# Patient Record
Sex: MALE | Race: WHITE | NOT HISPANIC OR LATINO | Employment: STUDENT | URBAN - METROPOLITAN AREA
[De-identification: names, ages, dates, MRNs, and addresses within clinical notes are randomized per-mention and may not be internally consistent; named-entity substitution may affect disease eponyms.]

---
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
## 2020-02-10 ENCOUNTER — OFFICE VISIT (OUTPATIENT)
Dept: PULMONOLOGY | Facility: CLINIC | Age: 20
End: 2020-02-10
Payer: MEDICAID

## 2020-02-10 ENCOUNTER — LAB VISIT (OUTPATIENT)
Dept: LAB | Facility: HOSPITAL | Age: 20
End: 2020-02-10
Attending: INTERNAL MEDICINE
Payer: MEDICAID

## 2020-02-10 ENCOUNTER — OFFICE VISIT (OUTPATIENT)
Dept: GASTROENTEROLOGY | Facility: CLINIC | Age: 20
End: 2020-02-10
Payer: MEDICAID

## 2020-02-10 VITALS
DIASTOLIC BLOOD PRESSURE: 73 MMHG | BODY MASS INDEX: 25.71 KG/M2 | OXYGEN SATURATION: 98 % | HEART RATE: 86 BPM | WEIGHT: 205.69 LBS | SYSTOLIC BLOOD PRESSURE: 116 MMHG

## 2020-02-10 VITALS
SYSTOLIC BLOOD PRESSURE: 117 MMHG | WEIGHT: 206.13 LBS | HEIGHT: 75 IN | RESPIRATION RATE: 18 BRPM | HEART RATE: 92 BPM | DIASTOLIC BLOOD PRESSURE: 75 MMHG | BODY MASS INDEX: 25.63 KG/M2

## 2020-02-10 DIAGNOSIS — R07.89 CHEST TIGHTNESS: ICD-10-CM

## 2020-02-10 DIAGNOSIS — R13.10 DYSPHAGIA, UNSPECIFIED TYPE: ICD-10-CM

## 2020-02-10 DIAGNOSIS — Z87.09 HISTORY OF ASTHMA: ICD-10-CM

## 2020-02-10 DIAGNOSIS — R13.19 ESOPHAGEAL DYSPHAGIA: Primary | ICD-10-CM

## 2020-02-10 DIAGNOSIS — R06.09 DOE (DYSPNEA ON EXERTION): ICD-10-CM

## 2020-02-10 DIAGNOSIS — J45.40 MODERATE PERSISTENT ASTHMA WITHOUT COMPLICATION: ICD-10-CM

## 2020-02-10 DIAGNOSIS — M25.571 ARTHRALGIA OF RIGHT ANKLE: ICD-10-CM

## 2020-02-10 PROBLEM — J45.909 MODERATE ASTHMA: Status: ACTIVE | Noted: 2020-02-10

## 2020-02-10 LAB
ALBUMIN SERPL BCP-MCNC: 4.6 G/DL (ref 3.5–5.2)
ALP SERPL-CCNC: 94 U/L (ref 55–135)
ALT SERPL W/O P-5'-P-CCNC: 23 U/L (ref 10–44)
ANION GAP SERPL CALC-SCNC: 9 MMOL/L (ref 8–16)
AST SERPL-CCNC: 17 U/L (ref 10–40)
BASOPHILS # BLD AUTO: 0.05 K/UL (ref 0–0.2)
BASOPHILS NFR BLD: 0.7 % (ref 0–1.9)
BILIRUB SERPL-MCNC: 0.6 MG/DL (ref 0.1–1)
BUN SERPL-MCNC: 11 MG/DL (ref 6–20)
CALCIUM SERPL-MCNC: 10 MG/DL (ref 8.7–10.5)
CHLORIDE SERPL-SCNC: 100 MMOL/L (ref 95–110)
CO2 SERPL-SCNC: 30 MMOL/L (ref 23–29)
CREAT SERPL-MCNC: 1.1 MG/DL (ref 0.5–1.4)
CRP SERPL-MCNC: 4.7 MG/L (ref 0–8.2)
DIFFERENTIAL METHOD: NORMAL
EOSINOPHIL # BLD AUTO: 0.3 K/UL (ref 0–0.5)
EOSINOPHIL NFR BLD: 4.1 % (ref 0–8)
ERYTHROCYTE [DISTWIDTH] IN BLOOD BY AUTOMATED COUNT: 11.7 % (ref 11.5–14.5)
ERYTHROCYTE [SEDIMENTATION RATE] IN BLOOD BY WESTERGREN METHOD: 10 MM/HR (ref 0–10)
EST. GFR  (AFRICAN AMERICAN): >60 ML/MIN/1.73 M^2
EST. GFR  (NON AFRICAN AMERICAN): >60 ML/MIN/1.73 M^2
GLUCOSE SERPL-MCNC: 91 MG/DL (ref 70–110)
HCT VFR BLD AUTO: 47.4 % (ref 40–54)
HGB BLD-MCNC: 15.5 G/DL (ref 14–18)
IMM GRANULOCYTES # BLD AUTO: 0.01 K/UL (ref 0–0.04)
LYMPHOCYTES # BLD AUTO: 1.8 K/UL (ref 1–4.8)
LYMPHOCYTES NFR BLD: 25.4 % (ref 18–48)
MCH RBC QN AUTO: 30.2 PG (ref 27–31)
MCHC RBC AUTO-ENTMCNC: 32.7 G/DL (ref 32–36)
MCV RBC AUTO: 92 FL (ref 82–98)
MONOCYTES # BLD AUTO: 0.4 K/UL (ref 0.3–1)
MONOCYTES NFR BLD: 5.6 % (ref 4–15)
NEUTROPHILS # BLD AUTO: 4.5 K/UL (ref 1.8–7.7)
NEUTROPHILS NFR BLD: 64.1 % (ref 38–73)
NRBC BLD-RTO: 0 /100 WBC
PLATELET # BLD AUTO: 247 K/UL (ref 150–350)
PMV BLD AUTO: 9.8 FL (ref 9.2–12.9)
POTASSIUM SERPL-SCNC: 4.3 MMOL/L (ref 3.5–5.1)
PROT SERPL-MCNC: 7.9 G/DL (ref 6–8.4)
RBC # BLD AUTO: 5.14 M/UL (ref 4.6–6.2)
SODIUM SERPL-SCNC: 139 MMOL/L (ref 136–145)
WBC # BLD AUTO: 7.08 K/UL (ref 3.9–12.7)

## 2020-02-10 PROCEDURE — 86038 ANTINUCLEAR ANTIBODIES: CPT

## 2020-02-10 PROCEDURE — 80053 COMPREHEN METABOLIC PANEL: CPT

## 2020-02-10 PROCEDURE — 99999 PR PBB SHADOW E&M-NEW PATIENT-LVL III: ICD-10-PCS | Mod: PBBFAC,,, | Performed by: INTERNAL MEDICINE

## 2020-02-10 PROCEDURE — 99999 PR PBB SHADOW E&M-NEW PATIENT-LVL III: CPT | Mod: PBBFAC,,, | Performed by: INTERNAL MEDICINE

## 2020-02-10 PROCEDURE — 99204 PR OFFICE/OUTPT VISIT, NEW, LEVL IV, 45-59 MIN: ICD-10-PCS | Mod: S$PBB,,, | Performed by: NURSE PRACTITIONER

## 2020-02-10 PROCEDURE — 99205 PR OFFICE/OUTPT VISIT, NEW, LEVL V, 60-74 MIN: ICD-10-PCS | Mod: S$PBB,,, | Performed by: INTERNAL MEDICINE

## 2020-02-10 PROCEDURE — 85025 COMPLETE CBC W/AUTO DIFF WBC: CPT

## 2020-02-10 PROCEDURE — 99205 OFFICE O/P NEW HI 60 MIN: CPT | Mod: S$PBB,,, | Performed by: INTERNAL MEDICINE

## 2020-02-10 PROCEDURE — 99204 OFFICE O/P NEW MOD 45 MIN: CPT | Mod: S$PBB,,, | Performed by: NURSE PRACTITIONER

## 2020-02-10 PROCEDURE — 99214 OFFICE O/P EST MOD 30 MIN: CPT | Mod: PBBFAC,PO,27 | Performed by: NURSE PRACTITIONER

## 2020-02-10 PROCEDURE — 99203 OFFICE O/P NEW LOW 30 MIN: CPT | Mod: PBBFAC,27,PO | Performed by: INTERNAL MEDICINE

## 2020-02-10 PROCEDURE — 85651 RBC SED RATE NONAUTOMATED: CPT

## 2020-02-10 PROCEDURE — 86140 C-REACTIVE PROTEIN: CPT

## 2020-02-10 PROCEDURE — 99999 PR PBB SHADOW E&M-EST. PATIENT-LVL IV: ICD-10-PCS | Mod: PBBFAC,,, | Performed by: NURSE PRACTITIONER

## 2020-02-10 PROCEDURE — 99999 PR PBB SHADOW E&M-EST. PATIENT-LVL IV: CPT | Mod: PBBFAC,,, | Performed by: NURSE PRACTITIONER

## 2020-02-10 RX ORDER — OMEPRAZOLE 40 MG/1
40 CAPSULE, DELAYED RELEASE ORAL DAILY
Qty: 30 CAPSULE | Refills: 2 | Status: SHIPPED | OUTPATIENT
Start: 2020-02-10 | End: 2020-05-10

## 2020-02-10 RX ORDER — OMEPRAZOLE 20 MG/1
CAPSULE, DELAYED RELEASE ORAL
COMMUNITY
Start: 2020-01-29 | End: 2020-02-10

## 2020-02-10 RX ORDER — BUDESONIDE AND FORMOTEROL FUMARATE DIHYDRATE 160; 4.5 UG/1; UG/1
AEROSOL RESPIRATORY (INHALATION)
COMMUNITY
Start: 2020-01-29

## 2020-02-10 NOTE — LETTER
February 10, 2020      Martha Escobar MD  [address] [facility] [address] [facility] [address]           [facility]  [address]  [facility] [address]  Phone: [phone]  Fax: [phone]          Patient: Jose Rincon   MR Number: [id number]   YOB: 2000   Date of Visit: 2/10/2020       Dear Dr. Martha Escobar:    Thank you for referring Jose Rincon to me for evaluation. Attached you will find relevant portions of my assessment and plan of care.    If you have questions, please do not hesitate to call me. I look forward to following Jose Rincon along with you.    Sincerely,    Ena Alcantara MD    Enclosure  CC:  No Recipients    If you would like to receive this communication electronically, please contact [email or web address] or [phone] to request more information on [employer] Link access.    For providers and/or their staff who would like to refer a patient to [facility], please contact us through our one-stop-shop provider referral line, [facility], at [phone].    If you feel you have received this communication in error or would no longer like to receive these types of communications, please e-mail [email or web address]

## 2020-02-10 NOTE — PROGRESS NOTES
"2/10/2020    Jose Rincon  New Patient Consult    Chief Complaint   Patient presents with    Shortness of Breath     chest tightness        HPI:Pt is a 20 yo male with asthma presenting for new evaluation.  Pt had bronchitis in 1/2020- symptoms were "allergies" then woke up with sweat/fevers/chills one night then developed cough w/ phlegm, tx with antibiotics and steroids and then the cough and phlegm were better. He went to the ED for an episode where he was sitting on the bench during basketball and became very short of breath and tachycardic but did not have to be admitted.    Pt has had dyspnea with exertion since this illness. DE LA GARZA on 1 flight of stairs. He goes to school in [place] where he plays basketball. Pt is here with his mother who assists with the history. He had allergies/asthma since childhood and was tx with zyrtec, singulair, an inhaler and nebulizer tx which he stopped when he was a teenager and feeling better. He moved to a new house in [place] in summer 2019- had an asthma attack in summer.   He is also seeing an allergist, cardiologist- for chest pain, and GI doctor- for trouble swallowing.  Pt denies frequent URIs but says he has had the flu several times.  Father had asthma as a child and mother has exercise induced asthma.  He lives in a basement in a house in [place]; shares with roommates. No specific exposure at the house. No one smokes. Denies vaping or any drug use. Does not smoke. Does not have pets or do any outdoor activities.  Inhalers: symbicort since 8/2019. Doesn't use rescue inh much but used to use before exercise.    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    The chief compliant  problem is new to me  PFSH:  Past Medical History:   Diagnosis Date    Asthma     Seasonal allergies          Past Surgical History:   Procedure " Laterality Date    HAND SURGERY      KNEE SURGERY Right     arthroscopy times 2    MOUTH SURGERY  2016    pulled a tooth     Social History     Tobacco Use    Smoking status: Never Smoker    Smokeless tobacco: Never Used   Substance Use Topics    Alcohol use: Yes     Alcohol/week: 1.0 standard drinks     Types: 1 Cans of beer per week     Comment: social, 2-3 x's per month    Drug use: No     Family History   Problem Relation Age of Onset    No Known Problems Mother     No Known Problems Father      Review of patient's allergies indicates:   Allergen Reactions    Biaxin [clarithromycin] Hives       Performance Status:The patient's activity level is functions out of house.      Review of Systems:  a review of eleven systems covering constitutional, Eye, HEENT, Psych, Respiratory, Cardiac, GI, , Musculoskeletal, Endocrine, Dermatologic was negative except for pertinent findings as listed ABOVE and below:  Wt loss 15#  Joint swelling and tenderness R ankle   Dysphagia with food sticking in esophagus, progressive over several months    Exam:Comprehensive exam done. /73 (BP Location: Right arm, Patient Position: Sitting)   Pulse 86   Wt 93.3 kg (205 lb 11 oz)   SpO2 98% Comment: on room air at rest  BMI 25.71 kg/m²   Exam included Vitals as listed, and patient's appearance and affect and alertness and mood, oral exam for yeast and hygiene and pharynx lesions and Mallapatti (M) score, neck with inspection for jvd and masses and thyroid abnormalities and lymph nodes (supraclavicular and infraclavicular nodes and axillary also examined and noted if abn), chest exam included symmetry and effort and fremitus and percussion and auscultation, cardiac exam included rhythm and gallops and murmur and rubs and jvd and edema, abdominal exam for mass and hepatosplenomegaly and tenderness and hernias and bowel sounds, Musculoskeletal exam with muscle tone and posture and mobility/gait and  strength, and skin  for rashes and cyanosis and pallor and turgor, extremity for clubbing.  Findings were normal except for pertinent findings listed below:  Tall, well appearing  Healing oral ulcer on L buccal mucosa  M1  Basilar crackles on left up to mid lung zone  R ankle examined- with tenderness on medial side inferior to malleolus, no erythema or swelling    Radiographs (ct chest and cxr) reviewed: view by direct vision   CXR 2016- normal    Labs none available   Ordering new labs    PFT will be done and results to be reviewed      Plan:  Clinical impression is ambiguous and will need repeated evaluation wrt dyspnea on exertion for 4 weeks preceded by upper respiratory infection, also with dysphagia pending GI evaluation.  With crackles in the lungs on physical exam, concern for inflammatory vs autoimmune process.  Patient was previously healthy and an athlete with exercise induced well controlled asthma.    Jose was seen today for shortness of breath.    Diagnoses and all orders for this visit:    DE LA GARZA (dyspnea on exertion)  -     CBC auto differential; Future  -     QUENTIN; Future  -     Comprehensive metabolic panel; Future  -     Complete PFT with bronchodilator; Future  -     Sedimentation rate; Future  -     C-REACTIVE PROTEIN; Future  -     CT Chest Without Contrast; Future    Moderate persistent asthma without complication    Dysphagia, unspecified type    Arthralgia of right ankle        Follow up in about 4 weeks (around 3/9/2020).    Discussed with patient above for education the following:      Patient Instructions   Go to the lab and get blood work  Follow up with GI  Follow up with allergist  Continue symbicort inhaler  Get CT chest  Get pulmonary function tests  Get records from allergist     With blunting of inspiratory limb on PFTs concerning for fixed extrathoracic or variable intrathoracic obstruction- adding on CT of neck

## 2020-02-10 NOTE — LETTER
February 10, 2020      Martha Escobar MD  [address] [facility] [address]           [facility] - Gastroenterology  [address] [facility] [address]  [facility] [address]  Phone: [phone]          Patient: Jose Rincon   MR Number: [id number]   YOB: 2000   Date of Visit: 2/10/2020       Dear Dr. Martha Escobar:    Thank you for referring Jose Rincon to me for evaluation. Attached you will find relevant portions of my assessment and plan of care.    If you have questions, please do not hesitate to call me. I look forward to following Jose Rincon along with you.    Sincerely,    Susan Lora, NP    Enclosure  CC:  No Recipients    If you would like to receive this communication electronically, please contact [email or web address] or [phone] to request more information on [employer] Link access.    For providers and/or their staff who would like to refer a patient to [facility], please contact us through our one-stop-shop provider referral line, [facility] , at [phone].    If you feel you have received this communication in error or would no longer like to receive these types of communications, please e-mail [email or web address]

## 2020-02-10 NOTE — PATIENT INSTRUCTIONS
Go to the lab and get blood work  Follow up with GI  Follow up with allergist  Continue symbicort inhaler  Get CT chest  Get pulmonary function tests  Get records from allergist

## 2020-02-10 NOTE — PATIENT INSTRUCTIONS
Soft Diet  Your healthcare provider has prescribed a soft diet (also called gastrointestinal soft diet). This means eating foods that are soft, low in fiber, and easy to digest. This diet is for people with digestive problems. A soft diet provides foods that are easy to chew and swallow. Foods should be bite-size and very soft or moist. Follow your healthcare providers specific instructions about what foods and drinks you may have. The general guidelines below can help you get started on this diet.       Beverages  OK: Milk, tea, coffee, fruit juices, carbonated beverages, nutrition shakes, and drinks (Note: Thin liquids may be hard to swallow. They may need to be thickened.)  Avoid: None, unless they need to be thickened  Breads and crackers  OK: Refined white, wheat, or seedless rye bread; mitchel or soda crackers that have been moistened; plain rolls or bagels; very soft tortillas  Avoid: Whole-grain breads, rolls, or bagels with nuts, raisins, or seeds; crackers, croutons, taco shells  Cereals and grains  OK: Cooked cereals, plain dry cereals that have been moistened, plain macaroni, spaghetti, noodles, rice  Avoid: Whole-grain cereals and granola, or cereals containing bran, raisins, seeds or nuts; coconut; brown or wild rice  Desserts and sweets  OK: Moist cake; soft fruit pie with bottom crust only; soft cookies moistened in milk or other liquid; gelatin, custard, pudding, plain ice cream, plain sherbet, sugar, honey, clear jelly  Avoid: Pastries, desserts, and ice cream that have nuts, coconut, seeds, or dried fruit; popcorn; chips of any kind including potato chips and tortilla chips; jam, marmalade  Eggs and cheese  OK: Poached, soft boiled, or scrambled eggs; cottage cheese, ricotta cheese, cream cheese, cheese sauces, or cheese melted in other dishes  Avoid: Crisp fried eggs, cheese slices and cubes  Fruits  OK: Avocado, banana, baked peeled apple, applesauce, peeled ripe peaches or pears, canned fruit  (apricots, cherries, peaches, pears), melons  Avoid: Raw apple, dried fruits, coconut, pineapple, grapes, fruit luis, fruit snacks  Meat and fish  OK: All fresh meat, poultry, or fish that is cooked until tender  Avoid: Meat, fish, or poultry that is fried; tough or stringy meat including monsalve, sausage, bratwurst, jerky, corned beef  Other protein foods  OK: Tofu, baked beans, smooth peanut butter or other nut or seed butters  Avoid: Deep-fried tofu; crunchy peanut or other nut or seed butters; nuts or seeds that are whole or chopped  Soups  OK: All soups, but they may need to be thickened. Thin liquid may be too hard to swallow.  Avoid: Soups made with stringy meat pieces or chunky vegetables  Vegetables  OK: Peeled and well-cooked potatoes or sweet potatoes; fresh, cooked, canned, or frozen vegetables without seeds, skin, or coarse fiber  Avoid: Raw vegetables, deep-fried vegetables (such as tempura), and corn  Date Last Reviewed: 8/1/2016  © 4355-2286 [employer]. [address] [place] [address], [place] [address]. All rights reserved. This information is not intended as a substitute for professional medical care. Always follow your healthcare professional's instructions.

## 2020-02-10 NOTE — PROGRESS NOTES
Subjective:       Patient ID: Jose Rincon is a 19 y.o. male, Body mass index is 25.76 kg/m².    Chief Complaint: Dysphagia      Patient is new to me.    Gastroesophageal Reflux   He complains of chest pain (chest tightness) and dysphagia. He reports no abdominal pain, no belching, no choking, no coughing, no early satiety, no globus sensation, no heartburn, no hoarse voice, no nausea or no sore throat. This is a chronic problem. The current episode started more than 1 month ago (Started several months ago). The problem occurs frequently. The problem has been gradually worsening. The symptoms are aggravated by certain foods (dysphagia to solids, especially meats; denies trouble swallowing liquid; occ has to vomit to get food up). Pertinent negatives include no anemia, melena or weight loss. There are no known risk factors. He has tried a PPI (Tried one week of Omeprazole 20 mg) for the symptoms. The treatment provided no relief. Past procedures do not include an EGD.     Review of Systems   Constitutional: Negative for appetite change, fever, unexpected weight change and weight loss.   HENT: Negative for hoarse voice, sore throat and trouble swallowing.    Respiratory: Negative for cough, choking and shortness of breath.    Cardiovascular: Positive for chest pain (chest tightness).   Gastrointestinal: Positive for dysphagia. Negative for abdominal pain, blood in stool, constipation, diarrhea, heartburn, melena, nausea and vomiting.   Genitourinary: Negative for difficulty urinating and dysuria.   Musculoskeletal: Negative for gait problem.   Skin: Negative for rash.   Neurological: Negative for speech difficulty.   Psychiatric/Behavioral: Negative for confusion.       Past Medical History:   Diagnosis Date    Asthma     Seasonal allergies       Past Surgical History:   Procedure Laterality Date    HAND SURGERY      KNEE SURGERY Right     arthroscopy times 2    MOUTH SURGERY  2016    pulled a tooth       Family History   Problem Relation Age of Onset    No Known Problems Mother     No Known Problems Father       Wt Readings from Last 10 Encounters:   02/10/20 93.5 kg (206 lb 2.1 oz)   12/29/17 90.7 kg (200 lb) (92 %, Z= 1.40)*   08/01/17 89.8 kg (198 lb) (92 %, Z= 1.39)*   10/09/16 88.5 kg (195 lb 1.7 oz) (92 %, Z= 1.40)*     * Growth percentiles are based on CDC (Boys, 2-20 Years) data.     Lab Results   Component Value Date    WBC 6.62 10/09/2016    HGB 17.1 10/09/2016    HCT 49.6 10/09/2016    MCV 88 10/09/2016     10/09/2016              Reviewed prior medical records including endoscopy history (see surgical history).     Objective:      Physical Exam   Constitutional: He is oriented to person, place, and time. He appears well-developed and well-nourished.   HENT:   Head: Normocephalic.   Eyes: Pupils are equal, round, and reactive to light.   Neck: Normal range of motion.   Cardiovascular: Normal rate, regular rhythm and normal heart sounds.   No murmur heard.  Pulmonary/Chest: Breath sounds normal. No respiratory distress. He has no wheezes.   Abdominal: Soft. Bowel sounds are normal. He exhibits no distension and no mass. There is no tenderness. There is no guarding.   Musculoskeletal: Normal range of motion.   Neurological: He is alert and oriented to person, place, and time.   Skin: Skin is warm and dry. No rash noted.   Non jaundiced   Psychiatric: He has a normal mood and affect. His speech is normal.         Assessment:       1. Esophageal dysphagia    2. Chest tightness    3. History of asthma           Plan:   All diagnoses and orders for this visit:    Esophageal dysphagia  - Start: omeprazole (PRILOSEC) 40 MG capsule; Take 1 capsule (40 mg total) by mouth once daily.  Dispense: 30 capsule; Refill: 2  - Schedule EGD, discussed procedure with patient and possible esophageal dilation may be performed during procedure if indicated, patient verbalized understanding  - Educated patient to eat  smaller more frequent meals and to eat slowly and advised to eat a soft diet.  - Possible UGI/esophagram/esophageal manometry if symptoms persist.     Chest tightness & History of asthma   - Recommend follow-up with pulmonology/PCP for continued evaluation and management    If no improvement in symptoms or symptoms worsen, call/follow-up at clinic or go to ER

## 2020-02-11 ENCOUNTER — TELEPHONE (OUTPATIENT)
Dept: GASTROENTEROLOGY | Facility: CLINIC | Age: 20
End: 2020-02-11

## 2020-02-11 ENCOUNTER — HOSPITAL ENCOUNTER (OUTPATIENT)
Dept: PULMONOLOGY | Facility: HOSPITAL | Age: 20
Discharge: HOME | End: 2020-02-11
Attending: INTERNAL MEDICINE
Payer: MEDICAID

## 2020-02-11 DIAGNOSIS — R76.8 ELEVATED IGE LEVEL: Primary | ICD-10-CM

## 2020-02-11 DIAGNOSIS — R06.09 DOE (DYSPNEA ON EXERTION): ICD-10-CM

## 2020-02-11 LAB
ANA SER QL IF: NORMAL
BRPFT: ABNORMAL
DLCO ADJ PRE: 34.76 ML/(MIN*MMHG) (ref 33.5–47.36)
DLCO SINGLE BREATH LLN: 33.5
DLCO SINGLE BREATH PRE REF: 86 %
DLCO SINGLE BREATH REF: 40.43
DLCOC SBVA LLN: 3.84
DLCOC SBVA PRE REF: 102.1 %
DLCOC SBVA REF: 4.94
DLCOC SINGLE BREATH LLN: 33.5
DLCOC SINGLE BREATH PRE REF: 86 %
DLCOC SINGLE BREATH REF: 40.43
DLCOVA LLN: 3.84
DLCOVA PRE REF: 102.1 %
DLCOVA PRE: 5.05 ML/(MIN*MMHG*L) (ref 3.84–6.04)
DLCOVA REF: 4.94
DLVAADJ PRE: 5.05 ML/(MIN*MMHG*L) (ref 3.84–6.04)
ERVN2 LLN: 1.78
ERVN2 PRE REF: 101.7 %
ERVN2 PRE: 1.81 L (ref 1.78–1.78)
ERVN2 REF: 1.78
FEF 25 75 CHG: 1.1 %
FEF 25 75 LLN: 3.65
FEF 25 75 POST REF: 101 %
FEF 25 75 PRE REF: 99.9 %
FEF 25 75 REF: 5.65
FET100 CHG: 6 %
FEV1 CHG: -1.6 %
FEV1 FVC CHG: 1.6 %
FEV1 FVC LLN: 72
FEV1 FVC POST REF: 101.4 %
FEV1 FVC PRE REF: 99.8 %
FEV1 FVC REF: 84
FEV1 LLN: 4.44
FEV1 POST REF: 93.7 %
FEV1 PRE REF: 95.2 %
FEV1 REF: 5.48
FRCN2 LLN: 2.62
FRCN2 PRE REF: 69.2 %
FRCN2 REF: 3.6
FVC CHG: -3.1 %
FVC LLN: 5.34
FVC POST REF: 91.3 %
FVC PRE REF: 94.3 %
FVC REF: 6.58
IVC PRE: 5.2 L (ref 5.34–7.83)
IVC SINGLE BREATH LLN: 5.34
IVC SINGLE BREATH PRE REF: 79 %
IVC SINGLE BREATH REF: 6.58
MVV LLN: 170
MVV PRE REF: 38.5 %
MVV REF: 200
PEF CHG: 1.4 %
PEF LLN: 8.65
PEF POST REF: 88.5 %
PEF PRE REF: 87.3 %
PEF REF: 11.32
POST FEF 25 75: 5.71 L/S (ref 3.65–7.64)
POST FET 100: 5.82 SEC
POST FEV1 FVC: 85.35 % (ref 72.5–95.77)
POST FEV1: 5.13 L (ref 4.44–6.51)
POST FVC: 6.01 L (ref 5.34–7.83)
POST PEF: 10.03 L/S (ref 8.65–14)
PRE DLCO: 34.76 ML/(MIN*MMHG) (ref 33.5–47.36)
PRE FEF 25 75: 5.64 L/S (ref 3.65–7.64)
PRE FET 100: 5.5 SEC
PRE FEV1 FVC: 83.99 % (ref 72.5–95.77)
PRE FEV1: 5.21 L (ref 4.44–6.51)
PRE FRC N2: 2.5 L
PRE FVC: 6.21 L (ref 5.34–7.83)
PRE MVV: 77 L/MIN (ref 169.93–229.91)
PRE PEF: 9.88 L/S (ref 8.65–14)
RVN2 LLN: 1.15
RVN2 PRE REF: -3.9 %
RVN2 PRE: -0.07 L (ref 1.15–2.5)
RVN2 REF: 1.82
TLCN2 LLN: 7.03
TLCN2 PRE REF: 75 %
TLCN2 PRE: 6.14 L (ref 7.03–9.33)
TLCN2 REF: 8.18
VA PRE: 6.93 L (ref 8.03–8.03)
VA SINGLE BREATH LLN: 8.03
VA SINGLE BREATH PRE REF: 86.3 %
VA SINGLE BREATH REF: 8.03
VCMAXN2 LLN: 5.34
VCMAXN2 PRE REF: 94.3 %
VCMAXN2 PRE: 6.21 L (ref 5.34–7.83)
VCMAXN2 REF: 6.58

## 2020-02-11 PROCEDURE — 94727 GAS DIL/WSHOT DETER LNG VOL: CPT | Mod: 26,,, | Performed by: INTERNAL MEDICINE

## 2020-02-11 PROCEDURE — 94729 DIFFUSING CAPACITY: CPT | Mod: 26,,, | Performed by: INTERNAL MEDICINE

## 2020-02-11 PROCEDURE — 94060 EVALUATION OF WHEEZING: CPT

## 2020-02-11 PROCEDURE — 94060 EVALUATION OF WHEEZING: CPT | Mod: 26,,, | Performed by: INTERNAL MEDICINE

## 2020-02-11 PROCEDURE — 94727 GAS DIL/WSHOT DETER LNG VOL: CPT

## 2020-02-11 PROCEDURE — 94727 PR PULM FUNCTION TEST BY GAS: ICD-10-PCS | Mod: 26,,, | Performed by: INTERNAL MEDICINE

## 2020-02-11 PROCEDURE — 94729 DIFFUSING CAPACITY: CPT

## 2020-02-11 PROCEDURE — 94060 PR EVAL OF BRONCHOSPASM: ICD-10-PCS | Mod: 26,,, | Performed by: INTERNAL MEDICINE

## 2020-02-11 PROCEDURE — 94729 PR C02/MEMBANE DIFFUSE CAPACITY: ICD-10-PCS | Mod: 26,,, | Performed by: INTERNAL MEDICINE

## 2020-02-11 NOTE — TELEPHONE ENCOUNTER
----- Message from Lenin Nevarez sent at 2/11/2020 12:50 PM CST -----  Contact: Patient  Type:  Patient Returning Call    Who Called:  Patient  Who Left Message for Patient:  Olegario Pope  Does the patient know what this is regarding?:  Scheduling procedure  Best Call Back Number:  [phone]  Additional Information:  NA

## 2020-02-12 ENCOUNTER — HOSPITAL ENCOUNTER (OUTPATIENT)
Dept: RADIOLOGY | Facility: HOSPITAL | Age: 20
Discharge: HOME | End: 2020-02-12
Attending: INTERNAL MEDICINE
Payer: MEDICAID

## 2020-02-12 DIAGNOSIS — R06.09 DOE (DYSPNEA ON EXERTION): ICD-10-CM

## 2020-02-12 PROCEDURE — 71250 CT CHEST WITHOUT CONTRAST: ICD-10-PCS | Mod: 26,,, | Performed by: RADIOLOGY

## 2020-02-12 PROCEDURE — 71250 CT THORAX DX C-: CPT | Mod: TC,PO

## 2020-02-12 PROCEDURE — 71250 CT THORAX DX C-: CPT | Mod: 26,,, | Performed by: RADIOLOGY

## 2020-02-13 ENCOUNTER — ANESTHESIA EVENT (OUTPATIENT)
Dept: ENDOSCOPY | Facility: HOSPITAL | Age: 20
End: 2020-02-13
Payer: MEDICAID

## 2020-02-13 ENCOUNTER — HOSPITAL ENCOUNTER (OUTPATIENT)
Facility: HOSPITAL | Age: 20
Discharge: HOME | End: 2020-02-13
Attending: INTERNAL MEDICINE | Admitting: INTERNAL MEDICINE
Payer: MEDICAID

## 2020-02-13 ENCOUNTER — TELEPHONE (OUTPATIENT)
Dept: PULMONOLOGY | Facility: CLINIC | Age: 20
End: 2020-02-13

## 2020-02-13 ENCOUNTER — ANESTHESIA (OUTPATIENT)
Dept: ENDOSCOPY | Facility: HOSPITAL | Age: 20
End: 2020-02-13
Payer: MEDICAID

## 2020-02-13 ENCOUNTER — HOSPITAL ENCOUNTER (OUTPATIENT)
Dept: RADIOLOGY | Facility: HOSPITAL | Age: 20
Discharge: HOME | End: 2020-02-13
Attending: INTERNAL MEDICINE
Payer: MEDICAID

## 2020-02-13 VITALS
HEIGHT: 75 IN | HEART RATE: 98 BPM | OXYGEN SATURATION: 99 % | TEMPERATURE: 98 F | WEIGHT: 205 LBS | BODY MASS INDEX: 25.49 KG/M2 | DIASTOLIC BLOOD PRESSURE: 82 MMHG | RESPIRATION RATE: 20 BRPM | SYSTOLIC BLOOD PRESSURE: 132 MMHG

## 2020-02-13 DIAGNOSIS — R13.10 DYSPHAGIA: Primary | ICD-10-CM

## 2020-02-13 DIAGNOSIS — R06.09 DOE (DYSPNEA ON EXERTION): ICD-10-CM

## 2020-02-13 PROCEDURE — 43239 EGD BIOPSY SINGLE/MULTIPLE: CPT | Mod: PO | Performed by: INTERNAL MEDICINE

## 2020-02-13 PROCEDURE — 70490 CT SOFT TISSUE NECK WITHOUT CONTRAST: ICD-10-PCS | Mod: 26,,, | Performed by: RADIOLOGY

## 2020-02-13 PROCEDURE — 88305 TISSUE EXAM BY PATHOLOGIST: ICD-10-PCS | Mod: 26,,, | Performed by: PATHOLOGY

## 2020-02-13 PROCEDURE — 43239 PR EGD, FLEX, W/BIOPSY, SGL/MULTI: ICD-10-PCS | Mod: ,,, | Performed by: INTERNAL MEDICINE

## 2020-02-13 PROCEDURE — D9220A PRA ANESTHESIA: Mod: ANES,,, | Performed by: ANESTHESIOLOGY

## 2020-02-13 PROCEDURE — 63600175 PHARM REV CODE 636 W HCPCS: Mod: PO | Performed by: INTERNAL MEDICINE

## 2020-02-13 PROCEDURE — 88305 TISSUE EXAM BY PATHOLOGIST: CPT | Mod: 26,,, | Performed by: PATHOLOGY

## 2020-02-13 PROCEDURE — 37000009 HC ANESTHESIA EA ADD 15 MINS: Mod: PO | Performed by: INTERNAL MEDICINE

## 2020-02-13 PROCEDURE — D9220A PRA ANESTHESIA: Mod: CRNA,,, | Performed by: NURSE ANESTHETIST, CERTIFIED REGISTERED

## 2020-02-13 PROCEDURE — 00731 ANES UPR GI NDSC PX NOS: CPT | Mod: PO | Performed by: INTERNAL MEDICINE

## 2020-02-13 PROCEDURE — 43239 EGD BIOPSY SINGLE/MULTIPLE: CPT | Mod: ,,, | Performed by: INTERNAL MEDICINE

## 2020-02-13 PROCEDURE — D9220A PRA ANESTHESIA: ICD-10-PCS | Mod: CRNA,,, | Performed by: NURSE ANESTHETIST, CERTIFIED REGISTERED

## 2020-02-13 PROCEDURE — 70490 CT SOFT TISSUE NECK W/O DYE: CPT | Mod: 26,,, | Performed by: RADIOLOGY

## 2020-02-13 PROCEDURE — 27201012 HC FORCEPS, HOT/COLD, DISP: Mod: PO | Performed by: INTERNAL MEDICINE

## 2020-02-13 PROCEDURE — 37000008 HC ANESTHESIA 1ST 15 MINUTES: Mod: PO | Performed by: INTERNAL MEDICINE

## 2020-02-13 PROCEDURE — 88305 TISSUE EXAM BY PATHOLOGIST: CPT | Performed by: PATHOLOGY

## 2020-02-13 PROCEDURE — 70490 CT SOFT TISSUE NECK W/O DYE: CPT | Mod: TC,PO

## 2020-02-13 PROCEDURE — D9220A PRA ANESTHESIA: ICD-10-PCS | Mod: ANES,,, | Performed by: ANESTHESIOLOGY

## 2020-02-13 PROCEDURE — 63600175 PHARM REV CODE 636 W HCPCS: Mod: PO | Performed by: NURSE ANESTHETIST, CERTIFIED REGISTERED

## 2020-02-13 RX ORDER — PROPOFOL 10 MG/ML
VIAL (ML) INTRAVENOUS
Status: DISCONTINUED | OUTPATIENT
Start: 2020-02-13 | End: 2020-02-13

## 2020-02-13 RX ORDER — SODIUM CHLORIDE 0.9 % (FLUSH) 0.9 %
10 SYRINGE (ML) INJECTION EVERY 6 HOURS PRN
Status: DISCONTINUED | OUTPATIENT
Start: 2020-02-13 | End: 2020-02-13 | Stop reason: HOSPADM

## 2020-02-13 RX ORDER — FENTANYL CITRATE 50 UG/ML
INJECTION, SOLUTION INTRAMUSCULAR; INTRAVENOUS
Status: DISCONTINUED | OUTPATIENT
Start: 2020-02-13 | End: 2020-02-13

## 2020-02-13 RX ORDER — SODIUM CHLORIDE, SODIUM LACTATE, POTASSIUM CHLORIDE, CALCIUM CHLORIDE 600; 310; 30; 20 MG/100ML; MG/100ML; MG/100ML; MG/100ML
INJECTION, SOLUTION INTRAVENOUS CONTINUOUS
Status: DISCONTINUED | OUTPATIENT
Start: 2020-02-13 | End: 2020-02-13 | Stop reason: HOSPADM

## 2020-02-13 RX ORDER — OMEPRAZOLE 40 MG/1
40 CAPSULE, DELAYED RELEASE ORAL
Qty: 60 CAPSULE | Refills: 6 | Status: SHIPPED | OUTPATIENT
Start: 2020-02-13 | End: 2020-05-26

## 2020-02-13 RX ORDER — LIDOCAINE HCL/PF 100 MG/5ML
SYRINGE (ML) INTRAVENOUS
Status: DISCONTINUED | OUTPATIENT
Start: 2020-02-13 | End: 2020-02-13

## 2020-02-13 RX ADMIN — PROPOFOL 50 MG: 10 INJECTION, EMULSION INTRAVENOUS at 11:02

## 2020-02-13 RX ADMIN — FENTANYL CITRATE 50 MCG: 50 INJECTION, SOLUTION INTRAMUSCULAR; INTRAVENOUS at 11:02

## 2020-02-13 RX ADMIN — PROPOFOL 100 MG: 10 INJECTION, EMULSION INTRAVENOUS at 11:02

## 2020-02-13 RX ADMIN — PROPOFOL 200 MG: 10 INJECTION, EMULSION INTRAVENOUS at 11:02

## 2020-02-13 RX ADMIN — SODIUM CHLORIDE, SODIUM LACTATE, POTASSIUM CHLORIDE, AND CALCIUM CHLORIDE: .6; .31; .03; .02 INJECTION, SOLUTION INTRAVENOUS at 10:02

## 2020-02-13 RX ADMIN — LIDOCAINE HYDROCHLORIDE 180 MG: 20 INJECTION, SOLUTION INTRAVENOUS at 11:02

## 2020-02-13 NOTE — TRANSFER OF CARE
"Anesthesia Transfer of Care Note    Patient: Jose Rincon    Procedure(s) Performed: Procedure(s) (LRB):  EGD (ESOPHAGOGASTRODUODENOSCOPY) (N/A)    Patient location: PACU    Anesthesia Type: general    Transport from OR: Transported from OR on 2-3 L/min O2 by NC with adequate spontaneous ventilation    Post pain: adequate analgesia    Post assessment: no apparent anesthetic complications and tolerated procedure well    Post vital signs: stable    Level of consciousness: awake, alert and oriented    Nausea/Vomiting: no nausea/vomiting    Complications: none    Transfer of care protocol was followed      Last vitals:   Visit Vitals  /83   Pulse 91   Temp 36.6 °C (97.8 °F)   Resp 16   Ht 6' 3" (1.905 m)   Wt 93 kg (205 lb 0.4 oz)   SpO2 99%   BMI 25.63 kg/m²     "

## 2020-02-13 NOTE — [employer] PATIENT INSTRUCTIONS
Discharge Summary/Instructions after an Endoscopic Procedure  Patient Name: Jose Rincon  Patient MRN: [id number]  Patient YOB: 2000 Thursday, February 13, 2020  Jeb Jones MD  RESTRICTIONS:  During your procedure today, you received medications for sedation.  These   medications may affect your judgment, balance and coordination.  Therefore,   for 24 hours, you have the following restrictions:   - DO NOT drive a car, operate machinery, make legal/financial decisions,   sign important papers or drink alcohol.    ACTIVITY:  Today: no heavy lifting, straining or running due to procedural   sedation/anesthesia.  The following day: return to full activity including work.  DIET:  Eat and drink normally unless instructed otherwise.     TREATMENT FOR COMMON SIDE EFFECTS:  - Mild abdominal pain, nausea, belching, bloating or excessive gas:  rest,   eat lightly and use a heating pad.  - Sore Throat: treat with throat lozenges and/or gargle with warm salt   water.  - Because air was used during the procedure, expelling large amounts of air   from your rectum or belching is normal.  - If a bowel prep was taken, you may not have a bowel movement for 1-3 days.    This is normal.  SYMPTOMS TO WATCH FOR AND REPORT TO YOUR PHYSICIAN:  1. Abdominal pain or bloating, other than gas cramps.  2. Chest pain.  3. Back pain.  4. Signs of infection such as: chills or fever occurring within 24 hours   after the procedure.  5. Rectal bleeding, which would show as bright red, maroon, or black stools.   (A tablespoon of blood from the rectum is not serious, especially if   hemorrhoids are present.)  6. Vomiting.  7. Weakness or dizziness.  GO DIRECTLY TO THE NEAREST EMERGENCY ROOM IF YOU HAVE ANY OF THE FOLLOWING:      Difficulty breathing              Chills and/or fever over 101 F   Persistent vomiting and/or vomiting blood   Severe abdominal pain   Severe chest pain   Black, tarry stools   Bleeding- more than one  tablespoon   Any other symptom or condition that you feel may need urgent attention  Your doctor recommends these additional instructions:  If any biopsies were taken, your doctors clinic will contact you in 1 to 2   weeks with any results.  You are being discharged to home.   Advance your diet as tolerated.   Continue your present medications.   We are waiting for your pathology results.   Your physician has recommended a repeat upper endoscopy in eight weeks to   evaluate the response to therapy.  For questions, problems or results please call your physician - Jeb Jones MD at Work:  [phone].  EMERGENCY PHONE NUMBER: [phone], LAB RESULTS: [phone]  IF A COMPLICATION OR EMERGENCY SITUATION ARISES AND YOU ARE UNABLE TO REACH   YOUR PHYSICIAN - GO DIRECTLY TO THE EMERGENCY ROOM.  ___________________________________________  Nurse Signature  ___________________________________________  Patient/Designated Responsible Party Signature  Jeb Jones MD  2/13/2020 11:18:13 AM  This report has been verified and signed electronically.  [employer]

## 2020-02-13 NOTE — ANESTHESIA POSTPROCEDURE EVALUATION
Anesthesia Post Evaluation    Patient: Jose Rincon    Procedure(s) Performed: Procedure(s) (LRB):  EGD (ESOPHAGOGASTRODUODENOSCOPY) (N/A)    Final Anesthesia Type: MAC    Patient location during evaluation: PACU  Patient participation: Yes- Able to Participate  Level of consciousness: awake and alert and oriented  Post-procedure vital signs: reviewed and stable  Pain management: adequate  Airway patency: patent    PONV status at discharge: No PONV  Anesthetic complications: no      Cardiovascular status: blood pressure returned to baseline  Respiratory status: unassisted, spontaneous ventilation and room air  Hydration status: euvolemic  Follow-up not needed.          Vitals Value Taken Time   /82 2/13/2020 11:26 AM   Temp 36.6 °C (97.9 °F) 2/13/2020 11:16 AM   Pulse 98 2/13/2020 11:26 AM   Resp 20 2/13/2020 11:26 AM   SpO2 99 % 2/13/2020 11:26 AM         No case tracking events are documented in the log.      Pain/Bonifacio Score: Bonifacio Score: 8 (2/13/2020 11:16 AM)

## 2020-02-13 NOTE — H&P
History & Physical - Short Stay  Gastroenterology      SUBJECTIVE:     Procedure: EGD    Chief Complaint/Indication for Procedure: Dysphagia    PTA Medications   Medication Sig    ALBUTEROL INHL Inhale into the lungs.    budesonide-formoterol 160-4.5 mcg (SYMBICORT) 160-4.5 mcg/actuation HFAA INL 2 PFS PO BID    omeprazole (PRILOSEC) 40 MG capsule Take 1 capsule (40 mg total) by mouth once daily.       Review of patient's allergies indicates:   Allergen Reactions    Biaxin [clarithromycin] Hives        Past Medical History:   Diagnosis Date    Asthma     Seasonal allergies      Past Surgical History:   Procedure Laterality Date    HAND SURGERY      KNEE SURGERY Right     arthroscopy times 2    MOUTH SURGERY  2016    pulled a tooth     Family History   Problem Relation Age of Onset    No Known Problems Mother     No Known Problems Father     Birth defects Paternal Grandfather      Social History     Tobacco Use    Smoking status: Never Smoker    Smokeless tobacco: Never Used   Substance Use Topics    Alcohol use: Yes     Alcohol/week: 1.0 standard drinks     Types: 1 Cans of beer per week     Comment: social, 2-3 x's per month    Drug use: No         OBJECTIVE:     Vital Signs (Most Recent)  Temp: 97.8 °F (36.6 °C) (02/13/20 1006)  Pulse: 91 (02/13/20 1006)  Resp: 16 (02/13/20 1006)  BP: 139/83 (02/13/20 1006)  SpO2: 99 % (02/13/20 1006)    Physical Exam:                                                       GENERAL:  Comfortable, in no acute distress.                                 HEENT EXAM:  Nonicteric.  No adenopathy.  Oropharynx is clear.               NECK:  Supple.                                                               LUNGS:  Clear.                                                               CARDIAC:  Regular rate and rhythm.  S1, S2.  No murmur.                      ABDOMEN:  Soft, positive bowel sounds, nontender.  No hepatosplenomegaly or masses.  No rebound or guarding.                                              EXTREMITIES:  No edema.     MENTAL STATUS:  Normal, alert and oriented.      ASSESSMENT/PLAN:     Assessment: Dysphagia    Plan: EGD    Anesthesia Plan: General    ASA Grade: ASA 2 - Patient with mild systemic disease with no functional limitations    MALLAMPATI SCORE:  II (hard and soft palate, upper portion of tonsils anduvula visible)

## 2020-02-13 NOTE — TELEPHONE ENCOUNTER
informed pt to go have additional blood work at [facility] facility.----- Message from Ena Alcantara MD sent at 2/11/2020  8:55 PM CST -----  Please call and ask pt to come in and have some more blood tests to check for fungal allergy. His IgE (allergic antibodies) level was very elevated at >3000.

## 2020-02-13 NOTE — TELEPHONE ENCOUNTER
Informed pt. Pt stated he had his CT done yesterday and he is unsure if they did his neck.  ----- Message from Ena Alcantara MD sent at 2/12/2020  5:14 PM CST -----  Please call until question I added on a CT of the neck (can be done same time he does CT chest) to check for anything in the throat which could be causing his symptoms

## 2020-02-13 NOTE — ANESTHESIA PREPROCEDURE EVALUATION
02/13/2020  Jose Rincon is a 19 y.o., male.    Anesthesia Evaluation    I have reviewed the Patient Summary Reports.    I have reviewed the Nursing Notes.   I have reviewed the Medications.     Review of Systems  Anesthesia Hx:  No problems with previous Anesthesia Denies Hx of Anesthetic complications    Social:  Non-Smoker    Cardiovascular:   Denies Hypertension.  Denies MI.  Denies CAD.    Denies CABG/stent.   Denies Angina. [place] G[place]    Pulmonary:   Denies COPD.  Denies Asthma. Shortness of breath  Denies Recent URI.    Renal/:   Denies Chronic Renal Disease.     Hepatic/GI:   Denies GERD. Denies Liver Disease.    Neurological:   Denies TIA. Denies CVA. Denies Seizures.    Endocrine:   Denies Diabetes. Denies Hypothyroidism.    Psych:   Denies Psychiatric History.          Physical Exam  General:  Well nourished    Airway/Jaw/Neck:  Airway Findings: Mouth Opening: Normal Tongue: Normal  General Airway Assessment: Adult, Good  Mallampati: II  Improves to II with phonation.  TM Distance: 4-6 cm      Dental:  Dental Findings: In tact   Chest/Lungs:  Chest/Lungs Findings: Clear to auscultation, Normal Respiratory Rate     Heart/Vascular:  Heart Findings: Rate: Normal  Rhythm: Regular Rhythm  Sounds: Normal  Heart murmur: negative       Mental Status:  Mental Status Findings:  Cooperative, Alert and Oriented         Anesthesia Plan  Type of Anesthesia, risks & benefits discussed:  Anesthesia Type:  general  Patient's Preference:   Intra-op Monitoring Plan: standard ASA monitors  Intra-op Monitoring Plan Comments:   Post Op Pain Control Plan:   Post Op Pain Control Plan Comments:   Induction:   IV  Beta Blocker:  Patient is not currently on a Beta-Blocker (No further documentation required).       Informed Consent: Patient understands risks and agrees with Anesthesia plan.  Questions answered. Anesthesia  consent signed with patient.  ASA Score: 1     Day of Surgery Review of History & Physical: I have interviewed and examined the patient. I have reviewed the patient's H&P dated:  There are no significant changes.  H&P update referred to the surgeon.         Ready For Surgery From Anesthesia Perspective.

## 2020-02-14 ENCOUNTER — TELEPHONE (OUTPATIENT)
Dept: PULMONOLOGY | Facility: CLINIC | Age: 20
End: 2020-02-14

## 2020-02-14 NOTE — TELEPHONE ENCOUNTER
Per Dr. Alcantara patient notified.      ----- Message from Ena Alcantara MD sent at 2/13/2020  4:30 PM CST -----  CT of the neck looks good, just some lymph nodes likely related to recent infection

## 2020-02-20 ENCOUNTER — TELEPHONE (OUTPATIENT)
Dept: PULMONOLOGY | Facility: CLINIC | Age: 20
End: 2020-02-20

## 2020-02-20 NOTE — TELEPHONE ENCOUNTER
Spoke with pt, pt stated that he went to his allergist and was able to start medication for his allergies.   ----- Message from Ena Alcantara MD sent at 2/20/2020  3:06 PM CST -----  Tests for fungal infection are negative.  Test for immune reaction to Aspergillus is elevated.  No sign of reaction to the Aspergillus in your lungs but you may want to ask ENT doctor if you need treatment for Aspergillus allergy from a sinus standpoint.

## 2020-02-20 NOTE — TELEPHONE ENCOUNTER
M for pt to call back for results.----- Message from Ena Alcantara MD sent at 2/20/2020  3:06 PM CST -----  Tests for fungal infection are negative.  Test for immune reaction to Aspergillus is elevated.  No sign of reaction to the Aspergillus in your lungs but you may want to ask ENT doctor if you need treatment for Aspergillus allergy from a sinus standpoint.

## 2020-03-03 LAB — FINAL PATHOLOGIC DIAGNOSIS: NORMAL

## 2020-03-05 ENCOUNTER — TELEPHONE (OUTPATIENT)
Dept: GASTROENTEROLOGY | Facility: CLINIC | Age: 20
End: 2020-03-05

## 2020-03-05 NOTE — TELEPHONE ENCOUNTER
----- Message from Steve Mcintosh sent at 3/5/2020 12:02 PM CST -----  Type: Needs Medical Advice    Who Called:  Patient    Best Call Back Number: [phone]  Additional Information: Patient states that he would like a callback regarding scheduling an Endoscopy on 05/13

## 2020-03-09 ENCOUNTER — TELEPHONE (OUTPATIENT)
Dept: GASTROENTEROLOGY | Facility: CLINIC | Age: 20
End: 2020-03-09

## 2020-03-09 NOTE — TELEPHONE ENCOUNTER
----- Message from Lenin Nevarez sent at 3/9/2020  2:42 PM CDT -----  Contact: Raj Rincon (Mother)  Type:  Patient Returning Call    Who Called:  Raj  Who Left Message for Patient:  unknown  Does the patient know what this is regarding?:  Unknown, no note/message  Best Call Back Number:  [phone]  Additional Information:  NA

## 2020-03-12 ENCOUNTER — TELEPHONE (OUTPATIENT)
Dept: GASTROENTEROLOGY | Facility: HOSPITAL | Age: 20
End: 2020-03-12

## 2020-03-12 NOTE — TELEPHONE ENCOUNTER
Reviewed results with patient and mother over telephone regarding diagnosis of eosinophilic esophagitis. Patient will continue high dose PPI twice daily until next EGD. He states another physician had written for budesonide swallowed in honey. I have asked him to discontinue this at this time so that we can evaluate which treatment works best for him. Plan repeat EGD in 2-3 months with biopsy of proximal and middle esophagus to evaluate for histologic improvement in eosinophil count. If no improvement then next step would be trial of either swallowed budesonide or fluticasone. Patient and mother verbalized understanding.

## 2020-04-23 ENCOUNTER — TELEPHONE (OUTPATIENT)
Dept: GASTROENTEROLOGY | Facility: CLINIC | Age: 20
End: 2020-04-23

## 2020-04-23 NOTE — TELEPHONE ENCOUNTER
Spoke with patient he canceled his procedure for now, because he is in [place] and does not know when he will be back, He will call us back to reschedule.

## 2020-04-23 NOTE — TELEPHONE ENCOUNTER
Called patient to reschedule his EGD on 5/5/20 due to provider bookout. There was no answer/no voicemail.

## 2020-05-07 DIAGNOSIS — R13.19 ESOPHAGEAL DYSPHAGIA: ICD-10-CM

## 2020-05-07 RX ORDER — OMEPRAZOLE 40 MG/1
40 CAPSULE, DELAYED RELEASE ORAL 2 TIMES DAILY
Qty: 60 CAPSULE | Refills: 1 | OUTPATIENT
Start: 2020-05-07 | End: 2020-07-06

## 2020-05-07 NOTE — TELEPHONE ENCOUNTER
Please let patient know I did not refill the Rx request for Omeprazole 40 mg once daily. He already has Rx for Omeprazole 40 mg BID which is how Dr. Jones recommends he take it. Thanks.

## 2020-05-26 ENCOUNTER — TELEPHONE (OUTPATIENT)
Dept: GASTROENTEROLOGY | Facility: CLINIC | Age: 20
End: 2020-05-26

## 2020-05-26 RX ORDER — OMEPRAZOLE 40 MG/1
CAPSULE, DELAYED RELEASE ORAL
Qty: 60 CAPSULE | Refills: 6 | Status: SHIPPED | OUTPATIENT
Start: 2020-05-26 | End: 2021-01-07

## 2020-05-26 NOTE — TELEPHONE ENCOUNTER
----- Message from Doreen Olivares sent at 5/26/2020  1:21 PM CDT -----  Contact: Pt  Type:  RX Refill Request    Who Called:  PT    Refill or New Rx:  refill  RX Name and Strength:  omeprazole (PRILOSEC) 40 MG capsule  How is the patient currently taking it? (ex. 1XDay):  As Directed  Is this a 30 day or 90 day RX:  as Directed  Preferred Pharmacy with phone number:    [id number] - [facility] [address] [facility] [address] [facility]  [address] [place] [id number]  Phone: [phone] Fax: [phone]  Best Call Back Number:  [phone]  Additional Information:  Please Advise ---Thank you

## 2020-05-26 NOTE — TELEPHONE ENCOUNTER
----- Message from Doreen Olivares sent at 5/26/2020  1:21 PM CDT -----  Contact: Pt  Type:  RX Refill Request    Who Called:  PT    Refill or New Rx:  refill  RX Name and Strength:  omeprazole (PRILOSEC) 40 MG capsule  How is the patient currently taking it? (ex. 1XDay):  As Directed  Is this a 30 day or 90 day RX:  as Directed  Preferred Pharmacy with phone number:    [facility] #[id number] - [facility] [address] [facility] [address] [facility] [facility]  [address] [place] [id number]  Phone: [phone] Fax: [phone]  Best Call Back Number:  [phone]  Additional Information:  Please Advise ---Thank you

## 2020-06-22 ENCOUNTER — TELEPHONE (OUTPATIENT)
Dept: GASTROENTEROLOGY | Facility: CLINIC | Age: 20
End: 2020-06-22

## 2020-06-22 NOTE — TELEPHONE ENCOUNTER
----- Message from Kelly Lisa sent at 6/22/2020  3:51 PM CDT -----  Regarding: Procedure  Contact: pt  Call placed to pod.     Patient called back to reschedule his Endo.  Call back   [phone]

## 2020-06-29 ENCOUNTER — TELEPHONE (OUTPATIENT)
Dept: GASTROENTEROLOGY | Facility: CLINIC | Age: 20
End: 2020-06-29

## 2020-06-29 NOTE — TELEPHONE ENCOUNTER
----- Message from Princess MILLIE Bishop sent at 6/29/2020  9:31 AM CDT -----  Regarding: r/s appt  Contact: pt  Type: Needs Medical Advice  Who Called:  pt  Best Call Back Number:     Additional Information: patient has something going on that day of his surgery and needs to r/s appt to the 7th of August. Please call

## 2020-10-16 ENCOUNTER — TELEPHONE (OUTPATIENT)
Dept: GASTROENTEROLOGY | Facility: CLINIC | Age: 20
End: 2020-10-16

## 2020-10-16 NOTE — TELEPHONE ENCOUNTER
----- Message from Stone Ballard sent at 10/16/2020  2:58 PM CDT -----  Regarding: Appt access  Contact: pt  Type:  Patient Returning Call    Who Called:  pt  Does the patient know what this is regarding?:  please follow up with pt to have ENDO rescheduled  Best Call Back Number:    Additional Information:  Thank you

## 2020-12-14 ENCOUNTER — TELEPHONE (OUTPATIENT)
Dept: GASTROENTEROLOGY | Facility: CLINIC | Age: 20
End: 2020-12-14

## 2020-12-14 NOTE — TELEPHONE ENCOUNTER
----- Message from Rissa Weinstein sent at 12/14/2020 12:34 PM CST -----  Regarding: Reschedule  Contact: Patient  Type: Needs Medical Advice  Who Called:  Patient  Best Call Back Number: [phone]  Additional Information: Patient is home on Christmas break and would like to have his EGD (ESOPHAGOGASTRODUODENOSCOPY) [572] rescheduled. He will only be here until 12/22/20 and then goes back to [place] for school.

## 2020-12-14 NOTE — TELEPHONE ENCOUNTER
Advised patient that there is no current avalibility in his timeframe. He will call back to schedule later

## 2021-01-06 ENCOUNTER — TELEPHONE (OUTPATIENT)
Dept: ENDOSCOPY | Facility: HOSPITAL | Age: 21
End: 2021-01-06

## 2021-01-07 RX ORDER — OMEPRAZOLE 40 MG/1
CAPSULE, DELAYED RELEASE ORAL
Qty: 60 CAPSULE | Refills: 6 | Status: SHIPPED | OUTPATIENT
Start: 2021-01-07 | End: 2022-01-12